# Patient Record
Sex: MALE | Race: BLACK OR AFRICAN AMERICAN | NOT HISPANIC OR LATINO | Employment: OTHER | URBAN - METROPOLITAN AREA
[De-identification: names, ages, dates, MRNs, and addresses within clinical notes are randomized per-mention and may not be internally consistent; named-entity substitution may affect disease eponyms.]

---

## 2022-02-17 ENCOUNTER — OFFICE VISIT (OUTPATIENT)
Dept: URGENT CARE | Facility: CLINIC | Age: 67
End: 2022-02-17
Payer: MEDICARE

## 2022-02-17 ENCOUNTER — APPOINTMENT (OUTPATIENT)
Dept: RADIOLOGY | Facility: IMAGING CENTER | Age: 67
End: 2022-02-17
Attending: PHYSICIAN ASSISTANT
Payer: MEDICARE

## 2022-02-17 VITALS
OXYGEN SATURATION: 98 % | HEART RATE: 109 BPM | SYSTOLIC BLOOD PRESSURE: 142 MMHG | HEIGHT: 71 IN | TEMPERATURE: 97.9 F | BODY MASS INDEX: 23.8 KG/M2 | WEIGHT: 170 LBS | RESPIRATION RATE: 24 BRPM | DIASTOLIC BLOOD PRESSURE: 90 MMHG

## 2022-02-17 DIAGNOSIS — S76.012A HIP STRAIN, LEFT, INITIAL ENCOUNTER: ICD-10-CM
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
## 2022-02-17 PROCEDURE — 99203 OFFICE O/P NEW LOW 30 MIN: CPT | Performed by: PHYSICIAN ASSISTANT

## 2022-02-17 PROCEDURE — 73501 X-RAY EXAM HIP UNI 1 VIEW: CPT | Mod: TC,LT | Performed by: RADIOLOGY

## 2022-02-17 RX ORDER — HYDROCODONE BITARTRATE AND ACETAMINOPHEN 5; 325 MG/1; MG/1
1 TABLET ORAL EVERY 6 HOURS PRN
Qty: 20 TABLET | Refills: 0 | Status: SHIPPED | OUTPATIENT
Start: 2022-02-17 | End: 2022-02-24

## 2022-02-17 RX ORDER — KETOROLAC TROMETHAMINE 30 MG/ML
30 INJECTION, SOLUTION INTRAMUSCULAR; INTRAVENOUS ONCE
Status: COMPLETED | OUTPATIENT
Start: 2022-02-17 | End: 2022-02-17

## 2022-02-17 RX ADMIN — KETOROLAC TROMETHAMINE 30 MG: 30 INJECTION, SOLUTION INTRAMUSCULAR; INTRAVENOUS at 14:22

## 2022-02-17 ASSESSMENT — ENCOUNTER SYMPTOMS
BOWEL INCONTINENCE: 0
NECK PAIN: 0
LOSS OF CONSCIOUSNESS: 0
BRUISES/BLEEDS EASILY: 0
BLURRED VISION: 0
BACK PAIN: 0
TINGLING: 0
NUMBNESS: 0

## 2022-02-17 NOTE — PROGRESS NOTES
Subjective:     eTrence Rasheed  is a 67 y.o. male who presents for Fall (S/F in bathroom 6am today)      Fall  The accident occurred 6 to 12 hours ago. Fall occurred: GLF in bathroom. He landed on concrete. There was no blood loss. The point of impact was the left hip. The pain is present in the left hip. The symptoms are aggravated by ambulation and movement. Pertinent negatives include no bowel incontinence, loss of consciousness, numbness or tingling. Associated symptoms comments: Denies injury to head neck or back.  Denies rib pain or injury.. He has tried nothing for the symptoms.     Patient reports with ground-level fall while in hotel bathroom this morning around 6 AM.  Slipped on hard surface of floor.  Reports impacting on left lateral hip.  Denies injury to head neck or back.  Denies loss of consciousness.  Denies incontinence.  Denies numbness tingling weakness.  Has tried no treatments thus far.  Denies history of surgery or significant injury to left hip.  Complains of pain with ambulation and movement.    Review of Systems   Eyes: Negative for blurred vision.   Gastrointestinal: Negative for bowel incontinence.   Musculoskeletal: Positive for joint pain ( left lateral hip). Negative for back pain and neck pain.   Neurological: Negative for tingling, loss of consciousness and numbness.   Endo/Heme/Allergies: Does not bruise/bleed easily.       Medications:    • This patient does not have an active medication from one of the medication groupers.    Allergies: Penicillins and Trazodone    Problem List: Terence Rasheed does not have a problem list on file.    Surgical History:  No past surgical history on file.    Past Social Hx: Ternece Rasheed       Past Family Hx:  Terence Rasheed family history is not on file.     Problem list, medications, and allergies reviewed by myself today in Epic.     Objective:   /90 (BP Location: Left arm, Patient Position: Sitting, BP Cuff Size: Adult long)   Pulse (!) 109  "  Temp 36.6 °C (97.9 °F) (Temporal)   Resp (!) 24   Ht 1.803 m (5' 11\")   Wt 77.1 kg (170 lb)   SpO2 98%   BMI 23.71 kg/m²     Physical Exam  Vitals and nursing note reviewed.   Constitutional:       General: He is not in acute distress.     Appearance: He is well-developed. He is not diaphoretic.   HENT:      Head: Normocephalic and atraumatic.      Right Ear: External ear normal.      Left Ear: External ear normal.      Nose: Nose normal.   Eyes:      General: No scleral icterus.        Right eye: No discharge.         Left eye: No discharge.      Conjunctiva/sclera: Conjunctivae normal.   Pulmonary:      Effort: Pulmonary effort is normal. No respiratory distress.   Musculoskeletal:      Cervical back: Neck supple.      Left hip: Tenderness ( lateral) present. No deformity, lacerations, bony tenderness or crepitus. Decreased range of motion ( pain w/ AROM). Normal strength.   Skin:     General: Skin is warm and dry.      Coloration: Skin is not pale.   Neurological:      Mental Status: He is alert and oriented to person, place, and time.      Coordination: Coordination normal.       DX hip -    2/17/2022 1:31 PM     HISTORY/REASON FOR EXAM:  Pelvic pain. Ground-level fall..        TECHNIQUE/EXAM DESCRIPTION AND NUMBER OF VIEWS:  2 views of the LEFT hip.     COMPARISON: None     FINDINGS:  No evidence of pelvic or proximal femoral fracture. There is early degenerative change of the left hip characterized by small osteophytes. There are pelvic phleboliths.     IMPRESSION:     No radiographic evidence of acute traumatic injury.             Exam Ended: 02/17/22  1:51 PM Last Resulted: 02/17/22  1:54 PM               Assessment/Plan:   Assessment      1. Hip strain, left, initial encounter  - DX-HIP-UNILATERAL-WITH PELVIS-1 VIEW LEFT; Future  - HYDROcodone-acetaminophen (NORCO) 5-325 MG Tab per tablet; Take 1 Tablet by mouth every 6 hours as needed for up to 7 days.  Dispense: 20 Tablet; Refill: 0  - ketorolac " (TORADOL) injection 30 mg    2. Fall, initial encounter    Other orders  - Consent for Opiate Prescription  Recommend conservative care, rest, ice, elevation, work on gentle ROM exercises, OTC Tylenol/NSAIDs, Rx [place] sent to to patient's needs for travel home, is driving home to [facility] with family members later today  Return to clinic with lack of resolution or progression of symptoms.  Cautioned regarding potential for sedation with medication.  ER precautions with any worsening symptoms are reviewed with patient/caregiver and they do express understanding    My total time spent caring for the patient on the day of the encounter was 30 minutes.   This does not include time spent on separately billable procedures/tests.      I have worn an N95 mask, gloves and eye protection for the entire encounter with this patient.     Differential diagnosis, natural history, supportive care, and indications for immediate follow-up discussed.